# Patient Record
Sex: FEMALE | Race: WHITE
[De-identification: names, ages, dates, MRNs, and addresses within clinical notes are randomized per-mention and may not be internally consistent; named-entity substitution may affect disease eponyms.]

---

## 2018-05-21 ENCOUNTER — HOSPITAL ENCOUNTER (EMERGENCY)
Dept: HOSPITAL 25 - UCCORT | Age: 18
Discharge: HOME | End: 2018-05-21
Payer: COMMERCIAL

## 2018-05-21 VITALS — SYSTOLIC BLOOD PRESSURE: 109 MMHG | DIASTOLIC BLOOD PRESSURE: 62 MMHG

## 2018-05-21 DIAGNOSIS — Z11.3: ICD-10-CM

## 2018-05-21 DIAGNOSIS — R59.1: Primary | ICD-10-CM

## 2018-05-21 DIAGNOSIS — Z88.0: ICD-10-CM

## 2018-05-21 PROCEDURE — 87480 CANDIDA DNA DIR PROBE: CPT

## 2018-05-21 PROCEDURE — 87491 CHLMYD TRACH DNA AMP PROBE: CPT

## 2018-05-21 PROCEDURE — 99212 OFFICE O/P EST SF 10 MIN: CPT

## 2018-05-21 PROCEDURE — 87591 N.GONORRHOEAE DNA AMP PROB: CPT

## 2018-05-21 PROCEDURE — 87510 GARDNER VAG DNA DIR PROBE: CPT

## 2018-05-21 PROCEDURE — 81003 URINALYSIS AUTO W/O SCOPE: CPT

## 2018-05-21 PROCEDURE — G0463 HOSPITAL OUTPT CLINIC VISIT: HCPCS

## 2018-05-21 PROCEDURE — 87661 TRICHOMONAS VAGINALIS AMPLIF: CPT

## 2018-05-21 NOTE — UC
Complaint Female HPI





- HPI Summary


HPI Summary: 





16 y/o female presents to the urgent care c/o swollen lymph node in the Rt side 

of her groin. Pt reports she noticed 2 weeks ago. She saw her GYN about 1 week 

ago for pain during sexual intercourse and GYN stated not to worry about the 

lymph node since it can be from shaving.  Pt states pain is 3/10 at touch.  She 

has not taking anything to alleviate symptoms.  Pt has been healthy and denies 

fever,Hx of STD, vaginal discharge, urinary symptoms, abdominal pain, N/V/D.





- History Of Current Complaint


Chief Complaint: UCSkin


Stated Complaint: SKIN COMPLAINT


Time Seen by Provider: 05/21/18 13:03


Hx Obtained From: Patient


Hx Last Menstrual Period: unknown, IUD


Pregnant?: No


Onset/Duration: Gradual Onset, Lasting Weeks - 2 weeks, Still Present


Timing: Constant


Severity Initially: Mild


Severity Currently: Mild


Pain Intensity: 4


Pain Scale Used: 0-10 Numeric


Character: Dull


Aggravating Factor(s): Other - touch


Alleviating Factor(s): Nothing


Associated Signs And Symptoms: Positive: Negative.  Negative: Fever, Back Pain, 

Vaginal Discharge, Genital Swelling, Genital Blisters





- Risk Factors


Ectopic Pregnancy Risk Factor: Negative


Ovarian Torsion Risk Factor: Negative





- Allergies/Home Medications


Allergies/Adverse Reactions: 


 Allergies











Allergy/AdvReac Type Severity Reaction Status Date / Time


 


Penicillins Allergy  Rash Verified 05/21/18 12:10














PMH/Surg Hx/FS Hx/Imm Hx


Previously Healthy: Yes - Pt denies PMHX





- Surgical History


Surgical History: None





- Family History


Family History: Prostate cancer, brain cancer





- Social History


Occupation: Student


Lives: With Family


Alcohol Use: None


Substance Use Type: None


Smoking Status (MU): Never Smoked Tobacco





- Immunization History


Vaccination Up to Date: Yes





Review of Systems


Constitutional: Negative


Skin: Negative


Eyes: Negative


ENT: Negative


Respiratory: Negative


Cardiovascular: Negative


Gastrointestinal: Negative


Genitourinary: Other - RT side lymphnode pain


Motor: Negative


Neurovascular: Negative


Musculoskeletal: Negative


Neurological: Negative


Psychological: Negative


Is Patient Immunocompromised?: No


All Other Systems Reviewed And Are Negative: Yes





Physical Exam





- Summary


Physical Exam Summary: 





Vital signs: reviewed


General: well developed, well nourished female adolescent sitting in the 

examining table w/o any acute distress. 


Head: Normocephalic, no lesions. 


Eyes: PERRLA, EOM's full, conjunctiva clear, fundi grossly normal. 


Ears: EAC's clear, TM's normal. Nose: Mucosa normal, no obstruction. 


Throat: Clear, no exudates, no lesions. 


Neck: Supple, no masses, no thyromegaly, no bruits. 


Chest: Lungs clear, no rales, no rhonchi, no wheezes. 


Heart: RR, no murmurs, no rubs, no gallops. 


Abdomen: Soft, no tenderness, no masses, BS normal. 


: Normal, no lesions, no discharge, no hernias noted.


Pelvic: I was assisted by Nurse Nano..External genitalia within normal limits

, Pt w/ a tender RT inguinal lymph node swollen and tender to palpation, 

movable and about 1.0cm in size,  There is no lesions, vesicles or erythema 

noted. Speculum exam: The vaginal walls are within normal limits w/yellowish 

and brown vaginal discharge, no the lesions or rashes. The cervix is closed 

with no lesions or masses IUD string  is in place in the cervical os. There is 

no CMT's, and no adnexal masses. Sample sent to Lab for G/C and Affirm panel.


Rectal: No lesions, no hemorrhoids, 


Back: Normal curvature, no tenderness. 


Extremities: FROM, no deformities, no edema, no erythema. 


Neuro: Physiological, no localizing findings. 


Skin: Normal, no rashes, no lesions noted. 








Triage Information Reviewed: Yes


Vital Signs: 


 Initial Vital Signs











Temp  98.4 F   05/21/18 12:08


 


Pulse  81   05/21/18 12:08


 


Resp  14   05/21/18 12:08


 


BP  109/62   05/21/18 12:08


 


Pulse Ox  100   05/21/18 12:08














 Complaint Female Dx





- Course


Course Of Treatment: 16 y/o female presents to the urgent care c/o swollen 

lymph node in the Rt side of her groin. Pt reports she noticed 2 weeks ago. She 

saw her GYN about 1 week ago for pain during sexual intercourse and GYN stated 

not to worry about the lymph node since it can be from shaving.  Pt states pain 

is 3/10 at touch.  She has not taking anything to alleviate symptoms.  Pt has 

been healthy and denies fever,Hx of STD, vaginal discharge, urinary symptoms, 

abdominal pain, N/V/D.Hx obtained. PE: Pt w/ a small movable and  RT 

inguinal lymphnode about 1.0cm in size. Speculum exam: The vaginal walls are 

within normal limits w/yellowish and brown vaginal discharge, no lesions or 

rashes. The cervix is closed with no lesions or masses IUD string  is in place 

in the cervical os. There is no CMT's, and no adnexal masses. Samples sent to 

the lab to screen for GC/Chlam. affirm.  Symptoms discussed w/ Dr Caraballo and 

he recommeded to observe if external genitalia has vesicles of feet w/ any 

fungal infection that may be the cause of lymphnode.  Pt w/o any fungal 

infection, or herpes vesicles or any signs of folliculitis.  however, Pt 

constantly shaves her genitalia. UA: negative. Pt Rx ibuprofen PO to alleviate 

pain and swelling and strongly advised to f/u w/ her PCP for further management 

in 1 week. Pt will be notified of any abnormal  result.  Pt understood and 

agreed w/ plan of care.





- Differential Dx/Diagnosis


Differential Diagnosis/HQI/PQRI: Bartholin Cyst, Cervicitis, Pelvic 

Inflammatory Disease, Renal Colic, Sexually Transmitted Disease, Ureteral Stone

, Urinary Tract Infection, Other - herpes, tinea, vaginosis


Provider Diagnoses: 1- Lymphadenopathy.  2- Screeening for STD's





Discharge





- Sign-Out/Discharge


Documenting (check all that apply): Discharge/Admit/Transfer - D/C home





- Discharge Plan


Condition: Stable


Disposition: HOME


Prescriptions: 


Ibuprofen TAB* [Motrin TAB* 600 MG] 600 mg PO Q6H PRN #30 tab


 PRN Reason: Pain


Patient Education Materials:  Lymphadenopathy (ED), Sexually Transmitted 

Diseases in Adolescents (ED)


Referrals: 


Og Blackwell MD [Primary Care Provider] - 1 Week


Additional Instructions: 


1- Please take ibuprofen PO q6-8hrs prn as  directed to alleviate pain and 

swelling.


2- Urine was negative. 


3- Specimen were sent to lab, if anything abnormal you will receive a call from 

us for further treatment. If everything returns negative please f/u w/ your PCP 

in 1 week for further treatment on your groin lymph node











- Billing Disposition and Condition


Condition: STABLE


Disposition: HOME

## 2018-10-16 ENCOUNTER — HOSPITAL ENCOUNTER (EMERGENCY)
Dept: HOSPITAL 25 - UCCORT | Age: 18
Discharge: HOME | End: 2018-10-16
Payer: COMMERCIAL

## 2018-10-16 VITALS — DIASTOLIC BLOOD PRESSURE: 61 MMHG | SYSTOLIC BLOOD PRESSURE: 100 MMHG

## 2018-10-16 DIAGNOSIS — Z88.0: ICD-10-CM

## 2018-10-16 DIAGNOSIS — J32.9: Primary | ICD-10-CM

## 2018-10-16 PROCEDURE — G0463 HOSPITAL OUTPT CLINIC VISIT: HCPCS

## 2018-10-16 PROCEDURE — 99212 OFFICE O/P EST SF 10 MIN: CPT

## 2018-10-16 NOTE — UC
Respiratory Complaint HPI





- HPI Summary


HPI Summary: 





17 yo female presents with sinus pain/pressure/congestion, green nasal discharge

, and dry cough. Her sinus symptoms have been going on about 3 weeks, but her 

cough began about a week ago. She has been taking sudafed, dayquill, and 

nyquill with mild relief. Denies fever, chills, SOB, chest pain, abdominal pain

, n/v.





- History of Current Complaint


Stated Complaint: COUGH/CONGESTION


Time Seen by Provider: 10/16/18 12:35


Hx Obtained From: Patient


Hx Last Menstrual Period: unknown, IUD


Onset/Duration: Gradual Onset


Severity Currently: None


Character: Cough: Nonproductive





- Allergies/Home Medications


Allergies/Adverse Reactions: 


 Allergies











Allergy/AdvReac Type Severity Reaction Status Date / Time


 


Penicillins Allergy  Rash Verified 10/16/18 12:41











Home Medications: 


 Home Medications





Levonorgestrel (Iud) [Kyleena IUD] 17.5 mcg IU ONCE 10/16/18 [History Confirmed 

10/16/18]











PMH/Surg Hx/FS Hx/Imm Hx





- Additional Past Medical History


Additional PMH: 





None





- Surgical History


Surgical History: None





- Family History


Known Family History: Positive: Other


Family History: Prostate cancer, brain cancer





- Social History


Occupation: Employed Full-time


Lives: With Family


Alcohol Use: None


Substance Use Type: None


Smoking Status (MU): Never Smoked Tobacco





- Immunization History


Vaccination Up to Date: Yes





Review of Systems


Constitutional: Negative


Skin: Negative


Eyes: Negative


ENT: Nasal Discharge, Sinus Congestion, Sinus Pain/Tenderness


Respiratory: Cough


Cardiovascular: Negative


Gastrointestinal: Negative


Neurovascular: Negative


Neurological: Negative


Psychological: Negative


All Other Systems Reviewed And Are Negative: Yes





Physical Exam





- Summary


Physical Exam Summary: 





GENERAL: NAD. WDWN. No pain distress.


SKIN: No rashes, sores, lesions, or open wounds.


HEENT:


            Head: AT/NC


            Eyes:  EOM intact. Conjunctiva clear without inflammation or 

discharge.


            Ears: Hearing grossly normal. TMs intact, no bulging, erythema, or 

edema. 


            Nose: Nasal mucosa mildly swollen and erythematous with green 

discharge. TTP maxillary and frontal sinus. Post nasal drip


            Throat: Posterior oropharynx without exudates, erythema, or 

tonsillar enlargement.  Uvula midline.


NECK: Supple. Nontender. No lymphadenopathy. 


CHEST:  CTAB. No r/r/w. No accessory muscle use. Breathing comfortably and in 

no distress.


CV:  RRR. Without m/r/g. Pulses intact. 


NEURO: Alert.


PSYCH: Age appropriate behavior.


Triage Information Reviewed: Yes


Vital Signs: 





Vital Signs:











Temp Pulse Resp BP Pulse Ox


 


 98.3 F   71   14   100/61   100 


 


 10/16/18 12:41  10/16/18 12:41  10/16/18 12:41  10/16/18 12:41  10/16/18 12:41











Vital Signs Reviewed: Yes





Respiratory Course/Dx





- Course


Course Of Treatment: Sinusitis





- Differential Dx/Diagnosis


Provider Diagnoses: Sinusitis





Discharge





- Sign-Out/Discharge


Documenting (check all that apply): Patient Departure


All imaging exams completed and their final reports reviewed: No Studies





- Discharge Plan


Condition: Stable


Disposition: HOME


Patient Education Materials:  Sinusitis (ED)


Referrals: 


No Primary Care Phys,NOPCP [Primary Care Provider] - 


Additional Instructions: 


If you develop a fever, shortness of breath, chest pain, new or worsening 

symptoms - please call your PCP or go to the ED.


 








- Billing Disposition and Condition


Condition: STABLE


Disposition: Home

## 2019-01-24 ENCOUNTER — HOSPITAL ENCOUNTER (EMERGENCY)
Dept: HOSPITAL 25 - UCCORT | Age: 19
Discharge: HOME | End: 2019-01-24
Payer: COMMERCIAL

## 2019-01-24 VITALS — DIASTOLIC BLOOD PRESSURE: 68 MMHG | SYSTOLIC BLOOD PRESSURE: 113 MMHG

## 2019-01-24 DIAGNOSIS — Z88.0: Primary | ICD-10-CM

## 2019-01-24 DIAGNOSIS — J11.1: ICD-10-CM

## 2019-01-24 LAB
FLUAV RNA SPEC QL NAA+PROBE: NEGATIVE
FLUBV RNA SPEC QL NAA+PROBE: NEGATIVE

## 2019-01-24 PROCEDURE — G0463 HOSPITAL OUTPT CLINIC VISIT: HCPCS

## 2019-01-24 PROCEDURE — 99211 OFF/OP EST MAY X REQ PHY/QHP: CPT

## 2019-01-24 NOTE — UC
Throat Pain/Nasal Abdiel HPI





- HPI Summary


HPI Summary: 


fever, cough, body aches for a few days.  Did not get flu shot this y ear.  

declines urine hcg.





- History of Current Complaint


Chief Complaint: UCGeneralIllness


Stated Complaint: FEVER/FATIGUE/COUGH/ACHY


Time Seen by Provider: 01/24/19 18:24


Hx Obtained From: Patient


Hx Last Menstrual Period: pt states she has an IUD, last menses dec '18


Pain Intensity: 6


Pain Scale Used: 0-10 Numeric





- Allergies/Home Medications


Allergies/Adverse Reactions: 


 Allergies











Allergy/AdvReac Type Severity Reaction Status Date / Time


 


Penicillins Allergy  Rash Verified 01/24/19 18:03











Home Medications: 


 Home Medications





Dm/Pseudoephed/Acetaminophen [Day-Time Multi-Symptom Co] 2 cap PO ONCE PRN 01/24 /19 [History Confirmed 01/24/19]











PMH/Surg Hx/FS Hx/Imm Hx


Previously Healthy: Yes





- Surgical History


Surgical History: None





- Family History


Known Family History: Positive: Other


Family History: Prostate cancer, brain cancer





- Social History


Alcohol Use: Occasionally


Substance Use Type: Marijuana


Substance Use Comment - Amount & Last Used: occ usage


Smoking Status (MU): Never Smoked Tobacco





- Immunization History


Vaccination Up to Date: Yes





Review of Systems


All Other Systems Reviewed And Are Negative: Yes


Constitutional: Positive: Fever, Chills, Fatigue


Skin: Negative: Rash


Eyes: Negative: Drainage


ENT: Positive: Sore Throat, Ear Ache, Nasal Discharge, Sinus Congestion, Sinus 

Pain/Tenderness.  Negative: Dental Pain


Respiratory: Positive: Cough.  Negative: Shortness Of Breath


Cardiovascular: Positive: Negative


Gastrointestinal: Positive: Nausea.  Negative: Vomiting, Diarrhea





Physical Exam


Triage Information Reviewed: Yes


Appearance: Well-Appearing


Vital Signs: 


 Initial Vital Signs











Temp  102.3 F   01/24/19 17:57


 


Pulse  120   01/24/19 17:57


 


Resp  16   01/24/19 17:57


 


BP  113/68   01/24/19 17:57


 


Pulse Ox  100   01/24/19 17:57











Vital Signs Reviewed: Yes





Throat Pain/Nasal Course/Dx





- Course


Assessment/Plan: Febrile and flu like illness for a few days.  rapid flu neg.  

vitals other wise good.





- Differential Dx/Diagnosis


Differential Diagnosis/HQI/PQRI: Influenza, URI, Other


Provider Diagnosis: 


 Flu-like symptoms








Discharge





- Sign-Out/Discharge


Documenting (check all that apply): Patient Departure


All imaging exams completed and their final reports reviewed: No Studies





- Discharge Plan


Condition: Good


Disposition: HOME


Patient Education Materials:  Influenza (ED)


Forms:  *Work Release


Referrals: 


No Primary Care Phys,NOPCP [Primary Care Provider] - 





- Billing Disposition and Condition


Condition: GOOD


Disposition: Home

## 2019-01-24 NOTE — XMS REPORT
[facility]

 Created on:2019



Patient:Kerry Reyes

Sex:Female

:2000

External Reference #:[id number]





Demographics







 Address  [address]



   [address]



   [place] [address]

 

 Phone  [phone]

 

 Phone  [phone]

 

 Email Address  [email or web address]

 

 Preferred Language  English

 

 Marital Status  Not  or 

 

 Judaism Affiliation  Unknown

 

 Race  White

 

 Ethnic Group  Not  or 









Author







 Organization  [facility]

 

 Address  [address] [place] [address]









Support







 Name  Relationship  Address  Phone

 

 Kerry Reyes  Unavailable  [address]  [phone]



     [place] [address]  

 

 [employer]agn[employer], Shanita  Unavailable  [address]  [phone]



     [place] [address]  









Care Team Providers







 Name  Role  Phone

 

 Akiko Malhotra  Unavailable  Unavailable









PROBLEMS







 Type  Condition  ICD9-CM Code  MFY74-EC Code  Onset  Condition  SNOMED Code



         Dates  Status  

 

 Problem  Superficial    N94.11    Active  52153554



   (introital)          



   dyspareunia          

 

 Problem  Anal spasm    K59.4    Active  87365584

 

 Problem  Dysmenorrhea,    N94.6    Active  387840249



   unspecified          

 

 Problem  Underweight    R63.6    Active  816071450

 

 Problem  Abnormal    R88.8    Active  



   findings in          



   other body          



   fluids and          



   substances          

 

 Problem  Irregular    N92.6    Active  86789126



   menstruation,          



   unspecified          







ALLERGIES







 Substance  Reaction  Event Type  Date  Status

 

 penicillin  rash  Drug Allergy    Active







ENCOUNTERS







 Encounter  Location  Date  Diagnosis

 

 [facility]    



 OBGYN  [place], [place] [id number]    

 

 [facility]  [facility]    



 OBGYN  [place], [place] [id number]    

 

 [facility]  [facility]    



 OBGYN  [place], [place] [id number]    

 

 [facility]  [facility]    



 OBGYN  [place], [place] [id number]    

 

 [facility]  [facility]    



 OBGYN  [place], [place] [id number]    

 

 [facility]  14 May, 2018  Superficial (
introital)



 OBGYN  [facility],    dyspareunia N94.11 and



   [place] [id number]    Anal spasm K59.4

 

 [facility]  [facility]    Encounter for 
routine



 OBGYN  [facility],    checking of intrauterine



   [place] [id number]    contraceptive device



       Z30.431

 

 [facility]  [facility]  13 Mar, 2018  



 OBGYN  [facility],    



   [place] [id number]    

 

 [facility]  [facility]  13 Mar, 2018  



 OBGYN  [facility],    



   [place] [id number]    

 

 [facility]  [facility]    Encounter for 
routine



 OBGYN  [facility],    checking of intrauterine



   [place] [id number]    contraceptive device



       Z30.431 and Encounter for



       screening for infections



       with a predominantly



       sexual mode of



       transmission Z11.3

 

 [facility]  [facility]    Encounter for



 OBGYN  [facility],    contraceptive management,



   [place] [id number]    unspecified Z30.9 and



       Irregular menstruation,



       unspecified N92.6

 

 [facility]  [facility]    Encounter for 
routine



 OBGYN  [facility],    checking of intrauterine



   [place] [id number]    contraceptive device



       Z30.431

 

 [facility]  [facility]  28 Dec, 2017  



 OBGYN  [facility],    



   [place] [id number]    

 

 [facility]  [facility]  28 Dec, 2017  Encounter for



 OBN  [facility],    gynecological examination



   [place] [id number]    (general) (routine)



       without abnormal findings



       Z01.419 ; Encounter for



       contraceptive management,



       unspecified Z30.9 ;



       Encounter for other



       general counseling and



       advice on contraception



       Z30.09 and Underweight



       R63.6

 

 [facility]  [facility]    



 OBGYN  [facility],    



   [place] [id number]    

 

 [facility]  [facility]    Leiomyoma of uterus,



 OBGYN  [facility],    unspecified D25.9 and



   [place] [id number]    Irregular menstruation,



       unspecified N92.6

 

 [facility]  [facility]    [facility] of uterus,



 [facility]    unspecified D25.9 and



   [place] [id number]    Irregular menstruation,



       unspecified N92.6

 

 [facility]  21 Mar, 2017  Irregular 
menstruation,



 [facility],    unspecified N92.6 ;



   [place] [id number]    Leiomyoma of uterus,



       unspecified D25.9 and



       Abnormal findings in



       other body fluids and



       substances R88.8

 

 [facility]  [facility]  21 Mar, 2017  Irregular 
menstruation,



 [facility]    unspecified N92.6 ;



   [place] [id number]    Leiomyoma of uterus,



       unspecified D25.9 and



       Abnormal findings in



       other body fluids and



       substances R88.8

 

 [facility]  [facility]    



 [facility],    



   [place] [id number]    

 

 [facility]    Irregular 
menstruation,



 [facility]    unspecified N92.6 and



   [place] [id number]    Leiomyoma of uterus,



       unspecified D25.9

 

 [facility]    Irregular 
menstruation,



 [facility],    unspecified N92.6



   [place] [id number]    

 

 [facility]  [facility]    Irregular 
menstruation,



 [facility],    unspecified N92.6



   [place] [id number]    

 

 [facility]  10 Oct, 2016  Underweight R63.6 ;



 [facility]    Amenorrhea, unspecified



   [place] [id number]    N91.2 and Dysmenorrhea,



       unspecified N94.6

 

 [facility]  [facility]  09 Sep, 2016  Urinary tract 
infection,



 [facility],    site not specified N39.0



   [place] [id number]    

 

 [facility]  29 Aug, 2016  



 [facility],    



   [place] [id number]    

 

 [facility]  [facility]  26 Aug, 2016  Dysuria R30.0



 OBGYN  [place], [place] [id number]    

 

 [facility]  26 Aug, 2016  Encounter for



 OBGYN  [facility],    gynecological examination



   [place] [id number]    (general) (routine) with



       abnormal findings Z01.411



       ; Underweight R63.6 ;



       Amenorrhea, unspecified



       N91.2 and Dysuria R30.0







IMMUNIZATIONS

No Known Immunizations



SOCIAL HISTORY

Never Assessed



REASON FOR REFERRAL





FUNCTIONAL STATUS





PLAN OF CARE





VITAL SIGNS





MEDICATIONS







 Medication  Instructions  Dosage  Frequency  Start Date  End Date  Duration  
Status

 

 Kyleena 19.5 mg  by intrauterine  1 ea          Active



   administration once            







PROCEDURES







 Procedure  Date Ordered  Result  Body Site

 

 [facility] appointment without 24 hr notice  2019    







RESULTS

No Results



REASON FOR VISIT

Annual, Did pt ever get referred to PT for levator? Don't see any referral.



Insurance Providers







 [facility]  Health  Member  Patient  Patient  Patient  
Patient  Patient  Subscriber  Subscriber  Subscriber  Group



 Insurance  Plan  Plan  Plan  Plan  ID  Relationship  Address  Phone  Name  
Date of  ID  Name  Date of  No



 Type  Insurance  Insurance  Insurance  Coverage    to Subscriber        Birth 
     Birth  



   Address  Phone  Name  Dates                    

 

 [place]  [address]            [place]erry  [id number]  
[id number]      [id number]



 [place]  [address]  [address]  [address]            Amy    A      [phone]



 Cross/Blue  [place]    Cross/Blue                      



 Shield  [id number]    Shield                      

 

 [place]  [address]    [place]            [place]  [id number]  
[id number]      



 Blue  [id number]  [address]  [address]            Amy    6      



 Cross/Blue  [place]    Cross/Blue                      



 Shield  [id number]    Shield

## 2020-02-08 ENCOUNTER — HOSPITAL ENCOUNTER (EMERGENCY)
Dept: HOSPITAL 25 - UCCORT | Age: 20
Discharge: HOME | End: 2020-02-08
Payer: COMMERCIAL

## 2020-02-08 VITALS — SYSTOLIC BLOOD PRESSURE: 93 MMHG | DIASTOLIC BLOOD PRESSURE: 74 MMHG

## 2020-02-08 DIAGNOSIS — R09.89: ICD-10-CM

## 2020-02-08 DIAGNOSIS — R05: ICD-10-CM

## 2020-02-08 DIAGNOSIS — R09.81: ICD-10-CM

## 2020-02-08 DIAGNOSIS — R51: ICD-10-CM

## 2020-02-08 DIAGNOSIS — J02.9: ICD-10-CM

## 2020-02-08 DIAGNOSIS — M79.10: ICD-10-CM

## 2020-02-08 DIAGNOSIS — B34.9: Primary | ICD-10-CM

## 2020-02-08 DIAGNOSIS — Z88.0: ICD-10-CM

## 2020-02-08 DIAGNOSIS — R53.83: ICD-10-CM

## 2020-02-08 PROCEDURE — 87651 STREP A DNA AMP PROBE: CPT

## 2020-02-08 PROCEDURE — 99212 OFFICE O/P EST SF 10 MIN: CPT

## 2020-02-08 PROCEDURE — G0463 HOSPITAL OUTPT CLINIC VISIT: HCPCS

## 2020-02-08 NOTE — UC
Throat Pain/Nasal Abdiel HPI





- HPI Summary


HPI Summary: 





Pt presents with c/o cough, nasal congestion, generalized  malaise,  sinus 

pressure, ST X 7 days. 





- History of Current Complaint


Chief Complaint: UCGeneralIllness


Stated Complaint: SORE THROAT, SINUS COMPLAINT


Time Seen by Provider: 02/08/20 16:21


Hx Obtained From: Patient


Hx Last Menstrual Period: 1/25/20


Pregnant?: No


Onset/Duration: Gradual Onset, Lasting Days, Still Present


Severity: Moderate


Pain Intensity: 7


Cough: Nonproductive


Associated Signs & Symptoms: Positive: Sinus Discomfort





- Epiglottits Risk Factors


Epiglottis Risk Factors: Negative





- Allergies/Home Medications


Allergies/Adverse Reactions: 


 Allergies











Allergy/AdvReac Type Severity Reaction Status Date / Time


 


Penicillins Allergy  Rash Verified 02/08/20 15:39














PMH/Surg Hx/FS Hx/Imm Hx


Previously Healthy: Yes





- Surgical History


Surgical History: None





- Family History


Known Family History: Positive: Cardiac Disease, Other


Family History: Prostate cancer, brain cancer





- Social History


Occupation: Employed Full-time


Lives: With Family


Alcohol Use: Occasionally


Substance Use Type: Marijuana


Substance Use Comment - Amount & Last Used: occ usage


Smoking Status (MU): Never Smoked Tobacco


Have You Smoked in the Last Year: No





- Immunization History


Vaccination Up to Date: Yes





Review of Systems


All Other Systems Reviewed And Are Negative: Yes


Constitutional: Positive: Fatigue


Skin: Positive: Negative


Eyes: Positive: Negative


ENT: Positive: Sore Throat, Sinus Congestion, Sinus Pain/Tenderness


Respiratory: Positive: Cough


Cardiovascular: Positive: Negative


Gastrointestinal: Positive: Negative


Genitourinary: Positive: Negative


Motor: Positive: Negative


Neurovascular: Positive: Negative


Musculoskeletal: Positive: Myalgia


Neurological: Positive: Headache


Psychological: Positive: Negative


Is Patient Immunocompromised?: No





Physical Exam


Triage Information Reviewed: Yes


Appearance: Well-Appearing


Vital Signs: 


 Initial Vital Signs











Temp  98.2 F   02/08/20 15:35


 


Pulse  97   02/08/20 15:35


 


Resp  16   02/08/20 15:35


 


BP  93/74   02/08/20 15:35


 


Pulse Ox  100   02/08/20 15:35











Vital Signs Reviewed: Yes


Eye Exam: Normal


ENT: Positive: Nasal congestion, Sinus tenderness


Dental Exam: Normal


Neck exam: Normal


Respiratory Exam: Normal


Cardiovascular Exam: Normal


Musculoskeletal Exam: Normal


Neurological Exam: Normal


Psychological Exam: Normal


Skin Exam: Normal





Throat Pain/Nasal Course/Dx





- Differential Dx/Diagnosis


Differential Diagnosis/HQI/PQRI: Influenza, Otitis Media, Pharyngitis, Sinusitis

, URI


Provider Diagnosis: 


 Viral syndrome








Discharge ED





- Sign-Out/Discharge


Documenting (check all that apply): Patient Departure


All imaging exams completed and their final reports reviewed: No Studies





- Discharge Plan


Condition: Stable


Disposition: HOME


Prescriptions: 


Guaifenesin/Pseudoephedrne HCl [Mucinex D -60 mg Tablet] 1 each PO Q12H #

14 tab.er.12h


predniSONE 10 mg TAB [Deltasone 10 MG TAB*] 30 mg PO DAILY #12 tab


Patient Education Materials:  Viral Syndrome (ED)


Forms:  *Work Release


Referrals: 


[facility] [place] [facility] [Outside] - If Needed


No Primary Care Phys,NOPCP [Primary Care Provider] - 





- Billing Disposition and Condition


Condition: STABLE


Disposition: Home